# Patient Record
Sex: FEMALE | Race: WHITE | NOT HISPANIC OR LATINO | Employment: UNEMPLOYED | URBAN - NONMETROPOLITAN AREA
[De-identification: names, ages, dates, MRNs, and addresses within clinical notes are randomized per-mention and may not be internally consistent; named-entity substitution may affect disease eponyms.]

---

## 2017-11-04 ENCOUNTER — OFFICE VISIT (OUTPATIENT)
Dept: URGENT CARE | Facility: PHYSICIAN GROUP | Age: 62
End: 2017-11-04
Payer: MEDICAID

## 2017-11-04 VITALS
OXYGEN SATURATION: 95 % | RESPIRATION RATE: 18 BRPM | DIASTOLIC BLOOD PRESSURE: 70 MMHG | HEIGHT: 64 IN | SYSTOLIC BLOOD PRESSURE: 122 MMHG | WEIGHT: 138 LBS | BODY MASS INDEX: 23.56 KG/M2 | HEART RATE: 60 BPM | TEMPERATURE: 97.8 F

## 2017-11-04 DIAGNOSIS — Z48.02 ENCOUNTER FOR STAPLE REMOVAL: ICD-10-CM

## 2017-11-04 PROCEDURE — 99202 OFFICE O/P NEW SF 15 MIN: CPT | Performed by: PHYSICIAN ASSISTANT

## 2017-11-04 NOTE — PROGRESS NOTES
"Chief Complaint   Patient presents with   • Suture / Staple Removal     x2wks R hip, placed by [facility]       HISTORY OF PRESENT ILLNESS: Patient is a 62 y.o. female who presents today because she is requesting to have the staples removed from her right hip incision. She had a surgery 2 weeks ago with an orthopedic doctor and vagus and she is unable to get back down. The area seems to be healing well without infection    There are no active problems to display for this patient.      Allergies:Morphine    Current Outpatient Prescriptions Or[facility] in [facility]   Medication Sig Dispense Refill   • HYDROCODONE-ACETAMINOPHEN PO Take  by mouth.     • TRAZODONE HCL PO Take  by mouth.     • DiphenhydrAMINE HCl (BENADRYL ALLERGY PO) Take  by mouth.     • MELOXICAM PO Take  by mouth.     • LORazepam (ATIVAN PO) Take  by mouth.     • gabapentin (NEURONTIN) 300 MG Cap Take 1 Cap by mouth 2 Times a Day. 90 Cap 0     No current Epic-ordered facility-administered medications on file.        History reviewed. No pertinent past medical history.    Social History   Substance Use Topics   • Smoking status: Never Smoker   • Smokeless tobacco: Never Used   • Alcohol use Yes      Comment: occ       No family status information on file.   History reviewed. No pertinent family history.    ROS:  Review of Systems   Constitutional: Negative for fever, chills, weight loss and malaise/fatigue.   HENT: Negative for ear pain, nosebleeds, congestion, sore throat and neck pain.    Eyes: Negative for blurred vision.   Respiratory: Negative for cough, sputum production, shortness of breath and wheezing.    Cardiovascular: Negative for chest pain, palpitations, orthopnea and leg swelling.   Gastrointestinal: Negative for heartburn, nausea, vomiting and abdominal pain.   Genitourinary: Negative for dysuria, urgency and frequency.     Exam:  Blood pressure 122/70, pulse 60, temperature 36.6 °C (97.8 °F), resp. rate 18, height 1.626 m (5' 4\"), weight 62.6 " kg (138 lb), SpO2 95 %, not currently breastfeeding.  General:  Well nourished, well developed female in NAD  Head:Normocephalic, atraumatic  Eyes: PERRLA, EOM within normal limits, no conjunctival injection, no scleral icterus, visual fields and acuity grossly intact.  Extremities: no clubbing, cyanosis, or edema.  Skin: Right hip has healing incision from surgery with 16 staples. No signs of infection.    Please note that this dictation was created using voice recognition software. I have made every reasonable attempt to correct obvious errors, but I expect that there are errors of grammar and possibly content that I did not discover before finalizing the note.    Assessment/Plan:  1. Encounter for staple removal     Staples removed without complication, Steri-Strips placed.    Followup with primary care in the next 7-10 days if not significantly improving, return to the urgent care or go to the emergency room sooner for any worsening of symptoms.

## 2018-12-29 ENCOUNTER — OFFICE VISIT (OUTPATIENT)
Dept: URGENT CARE | Facility: PHYSICIAN GROUP | Age: 63
End: 2018-12-29

## 2018-12-29 VITALS
TEMPERATURE: 98.2 F | RESPIRATION RATE: 14 BRPM | DIASTOLIC BLOOD PRESSURE: 84 MMHG | SYSTOLIC BLOOD PRESSURE: 130 MMHG | WEIGHT: 139 LBS | HEART RATE: 82 BPM | BODY MASS INDEX: 23.86 KG/M2 | OXYGEN SATURATION: 98 %

## 2018-12-29 DIAGNOSIS — J06.9 VIRAL URI WITH COUGH: ICD-10-CM

## 2018-12-29 LAB
FLUAV+FLUBV AG SPEC QL IA: POSITIVE
INT CON NEG: NORMAL
INT CON POS: NORMAL

## 2018-12-29 PROCEDURE — 99213 OFFICE O/P EST LOW 20 MIN: CPT | Performed by: PHYSICIAN ASSISTANT

## 2018-12-29 PROCEDURE — 87804 INFLUENZA ASSAY W/OPTIC: CPT | Performed by: PHYSICIAN ASSISTANT

## 2018-12-30 ASSESSMENT — ENCOUNTER SYMPTOMS: COUGH: 1

## 2018-12-31 ASSESSMENT — ENCOUNTER SYMPTOMS
SORE THROAT: 1
BLURRED VISION: 0
CHILLS: 0
SHORTNESS OF BREATH: 0
DIARRHEA: 0
HEADACHES: 1
NAUSEA: 0
MYALGIAS: 0
FEVER: 1
WHEEZING: 0
RHINORRHEA: 1
VOMITING: 0
SPUTUM PRODUCTION: 0
DIZZINESS: 0
EYE PAIN: 0

## 2018-12-31 NOTE — PROGRESS NOTES
Subjective:      Nan Barry is a 63 y.o. female who presents with Cough (started yesterday)      URI    This is a new problem. The current episode started yesterday. The problem has been unchanged. Maximum temperature: Subjective fever last night. Associated symptoms include congestion, coughing, headaches, a plugged ear sensation, rhinorrhea and a sore throat. Pertinent negatives include no chest pain, diarrhea, ear pain, nausea, rash, vomiting or wheezing. She has tried acetaminophen for the symptoms. The treatment provided mild relief.       Review of Systems   Constitutional: Positive for fever (Subjective fever last night) and malaise/fatigue. Negative for chills.   HENT: Positive for congestion, rhinorrhea and sore throat. Negative for ear pain.    Eyes: Negative for blurred vision and pain.   Respiratory: Positive for cough. Negative for sputum production, shortness of breath and wheezing.    Cardiovascular: Negative for chest pain.   Gastrointestinal: Negative for diarrhea, nausea and vomiting.   Musculoskeletal: Negative for myalgias.   Skin: Negative for rash.   Neurological: Positive for headaches. Negative for dizziness.       PMH:  has no past medical history on file.  MEDS:   Current Outpatient Prescriptions:   •  HYDROCODONE-ACETAMINOPHEN PO, Take  by mouth., Disp: , Rfl:   •  TRAZODONE HCL PO, Take  by mouth., Disp: , Rfl:   •  DiphenhydrAMINE HCl (BENADRYL ALLERGY PO), Take  by mouth., Disp: , Rfl:   •  MELOXICAM PO, Take  by mouth., Disp: , Rfl:   •  LORazepam (ATIVAN PO), Take  by mouth., Disp: , Rfl:   •  gabapentin (NEURONTIN) 300 MG Cap, Take 1 Cap by mouth 2 Times a Day., Disp: 90 Cap, Rfl: 0  ALLERGIES:   Allergies   Allergen Reactions   • Flagyl [Kdc:Metronidazole+Tartrazine] Unspecified     Insomnia per pt   • Morphine      SURGHX: No past surgical history on file.  SOCHX:  reports that she has never smoked. She has never used smokeless tobacco. She reports that she drinks alcohol.  She reports that she does not use drugs.  FH: Family history was reviewed, no pertinent findings to report     Objective:     /84   Pulse 82   Temp 36.8 °C (98.2 °F) (Temporal)   Resp 14   Wt 63 kg (139 lb)   SpO2 98%   BMI 23.86 kg/m²        Physical Exam   Constitutional: She is oriented to person, place, and time. Vital signs are normal. She appears well-developed and well-nourished.   HENT:   Head: Normocephalic and atraumatic.   Right Ear: Tympanic membrane, external ear and ear canal normal.   Left Ear: Tympanic membrane, external ear and ear canal normal.   Nose: Mucosal edema and rhinorrhea present.   Mouth/Throat: Uvula is midline, oropharynx is clear and moist and mucous membranes are normal.   Eyes: Pupils are equal, round, and reactive to light. Conjunctivae and EOM are normal.   Neck: Normal range of motion.   Cardiovascular: Normal rate, regular rhythm and normal heart sounds.    No murmur heard.  Pulmonary/Chest: Effort normal and breath sounds normal. She has no decreased breath sounds. She has no wheezes. She has no rhonchi. She has no rales.   Lymphadenopathy:     She has no cervical adenopathy.   Neurological: She is alert and oriented to person, place, and time.   Skin: Skin is warm and dry. Capillary refill takes less than 2 seconds.   Psychiatric: She has a normal mood and affect. Her behavior is normal. Judgment normal.   Vitals reviewed.      POCT Influenza A/B - Negative     Assessment/Plan:     1. Viral URI with cough  - POCT Influenza A/B  - OTC cold/flu medications  - PO fluids  - Rest  - Tylenol or ibuprofen as needed for fever > 100.4 F      Differential Diagnosis, natural history, and supportive care discussed. Return to the Urgent Care or follow up with your PCP if symptoms fail to resolve, or for any new or worsening symptoms. Emergency room precautions discussed. Patient and/or family appears understanding of information.

## 2024-11-26 ENCOUNTER — HOSPITAL ENCOUNTER (EMERGENCY)
Facility: MEDICAL CENTER | Age: 69
End: 2024-11-26
Attending: EMERGENCY MEDICINE
Payer: MEDICARE

## 2024-11-26 VITALS
TEMPERATURE: 97.3 F | SYSTOLIC BLOOD PRESSURE: 142 MMHG | HEIGHT: 65 IN | HEART RATE: 74 BPM | DIASTOLIC BLOOD PRESSURE: 81 MMHG | RESPIRATION RATE: 16 BRPM | OXYGEN SATURATION: 94 % | BODY MASS INDEX: 29.79 KG/M2 | WEIGHT: 178.79 LBS

## 2024-11-26 DIAGNOSIS — M79.604 RIGHT LEG PAIN: ICD-10-CM

## 2024-11-26 DIAGNOSIS — M54.16 LUMBAR RADICULOPATHY: ICD-10-CM

## 2024-11-26 PROCEDURE — 99283 EMERGENCY DEPT VISIT LOW MDM: CPT

## 2024-11-26 PROCEDURE — 700102 HCHG RX REV CODE 250 W/ 637 OVERRIDE(OP): Performed by: EMERGENCY MEDICINE

## 2024-11-26 PROCEDURE — A9270 NON-COVERED ITEM OR SERVICE: HCPCS | Performed by: EMERGENCY MEDICINE

## 2024-11-26 RX ORDER — METHYLPREDNISOLONE 4 MG/1
TABLET ORAL
Qty: 21 EACH | Refills: 0 | Status: SHIPPED | OUTPATIENT
Start: 2024-11-26

## 2024-11-26 RX ORDER — ACETAMINOPHEN 500 MG
1000 TABLET ORAL ONCE
Status: COMPLETED | OUTPATIENT
Start: 2024-11-26 | End: 2024-11-26

## 2024-11-26 RX ORDER — IBUPROFEN 600 MG/1
600 TABLET, FILM COATED ORAL ONCE
Status: COMPLETED | OUTPATIENT
Start: 2024-11-26 | End: 2024-11-26

## 2024-11-26 RX ORDER — CYCLOBENZAPRINE HCL 5 MG
5-10 TABLET ORAL 3 TIMES DAILY PRN
Qty: 30 TABLET | Refills: 0 | Status: SHIPPED | OUTPATIENT
Start: 2024-11-26

## 2024-11-26 RX ADMIN — IBUPROFEN 600 MG: 600 TABLET, FILM COATED ORAL at 14:03

## 2024-11-26 RX ADMIN — ACETAMINOPHEN 1000 MG: 500 TABLET ORAL at 14:02

## 2024-11-26 NOTE — ED NOTES
Pt given d/c instructions, f/u info and aware of RX x 1 for  with verbal understanding.  VSS at discharge.  Pt ambulatory fraom the ED w/ steady gait.  All belongings in possession on discharge.  Pt and family member escorted to the lobby by RN.

## 2024-11-26 NOTE — ED TRIAGE NOTES
"Chief Complaint   Patient presents with    Pain     Right leg pain \"for a long time.\" Pain is more intense and radiates now up to groin and knee. CMS intact.   Hx of femur dysplasia and hip replacements.        Pt ambulatory to triage. Pt A&Ox4, room air.     Pt to lobby . Pt educated on alerting staff in changes to condition. Pt verbalized understanding.     BP (!) 145/88   Pulse 81   Temp 36.3 °C (97.3 °F) (Temporal)   Resp 20   Ht 1.651 m (5' 5\")   Wt 81.1 kg (178 lb 12.7 oz)   SpO2 97%   BMI 29.75 kg/m²     "

## 2024-11-26 NOTE — ED PROVIDER NOTES
"ER Provider Note    Scribed for Dave Martinez M.D. by Ceferino Moyer. 11/26/2024   12:47 PM    Primary Care Provider: MARIO Bunch    CHIEF COMPLAINT  Chief Complaint   Patient presents with    Pain     Right leg pain \"for a long time.\" Pain is more intense and radiates now up to groin and knee. CMS intact.   Hx of femur dysplasia and hip replacements.      EXTERNAL RECORDS REVIEWED  Outpatient Notes Phone call to [facility] eight days ago, MRI ordered. Seen in October for knee pain, given Mobic and referred to PT    HPI/ROS    LIMITATION TO HISTORY   Select: : None    Nan Barry is a 69 y.o. female who presents to the ED for evaluation of right leg pain onset prior to arrival. She states that she has been having right leg pain for a couple months now, she has a history of femur dysplasia and hip replacements. This pain started in her right knee and will now radiate upward and into her groin, whereas it used to stay localized to the knee. This pain is worsened when she sits down. She has been seen at [facility] for this, plain films were obtained and they planned for an MRI of her hip. She denies any left leg pain. Mobic was given for her pain from [facility] but does not help her pain, and she shortly discontinued taking it after prescription. She has been taking Tylenol mostly.     PAST MEDICAL HISTORY  History reviewed. No pertinent past medical history.    SURGICAL HISTORY  History reviewed. No pertinent surgical history.    FAMILY HISTORY  History reviewed. No pertinent family history.    SOCIAL HISTORY   reports that she has never smoked. She has never used smokeless tobacco. She reports current alcohol use. She reports that she does not use drugs.    CURRENT MEDICATIONS  Discharge Medication List as of 11/26/2024  2:16 PM        CONTINUE these medications which have NOT CHANGED    Details   !! meloxicam (MOBIC) 15 MG tablet Take 1 Tablet by mouth every day., Disp-30 Tablet, R-0, Normal    " "  HYDROCODONE-ACETAMINOPHEN PO Take  by mouth., Historical Med      TRAZODONE HCL PO Take  by mouth., Historical Med      DiphenhydrAMINE HCl (BENADRYL ALLERGY PO) Take  by mouth., Historical Med      !! MELOXICAM PO Take  by mouth., Historical Med      LORazepam (ATIVAN PO) Take  by mouth., Historical Med      gabapentin (NEURONTIN) 300 MG Cap Take 1 Cap by mouth 2 Times a Day., Disp-90 Cap, R-0, Print       !! - Potential duplicate medications found. Please discuss with provider.          ALLERGIES  Allergies   Allergen Reactions    Flagyl [Kdc:Metronidazole+Tartrazine] Unspecified     Insomnia per pt    Morphine         PHYSICAL EXAM  BP (!) 145/88   Pulse 81   Temp 36.3 °C (97.3 °F) (Temporal)   Resp 20   Ht 1.651 m (5' 5\")   Wt 81.1 kg (178 lb 12.7 oz)   SpO2 97%   BMI 29.75 kg/m²    Constitutional: Mild distress    HENT: Normocephalic, Atraumatic.  Oropharynx moist.   Eyes: EOMI, Conjunctiva normal, No discharge.   CV: Good pulses  Thorax & Lungs: No respiratory distress.   Skin: Warm, Dry, No rash noted    Musculoskeletal: No major deformities noted. Slight tenderness to palpation of the right suprapubic patellar area, tenderness in right gluteal area. Full passive and active ROM of hip and knee. 2+ pulses distally.  Neurologic: Awake, alert. Moves all extremities spontaneously.  Psychiatric: Affect normal, Mood normal.      COURSE & MEDICAL DECISION MAKING     ED OBS: No; Patient does not meet criteria for ED Observation.     INITIAL ASSESSMENT, COURSE AND PLAN    Care Narrative: Patient with right leg pain.  On examination I do not believe it is representative of the knee or the hip joint.  Seems just superior to the knee.  I question was there this is a radicular pain from the lumbar area.  Have recommended Medrol Dosepak, muscle relaxers, Tylenol, ibuprofen, following up with .  Possible MRI of the lumbar spine.  Discussed with her no indication for an acute MRI at this time.  Will discharge " patient home.    12:47 PM - Patient was evaluated at bedside. She presents for right knee pain, symptoms consistent with lumbar radiculopathy. She is requesting MRI imaging of her knee but after discussion we agreed that this would likely not solve her problems. I recommend high dose Tylenol and Motrin, as well as beginning a steroid dose pack. Patient will now be discharged at this time. Discussed return precautions and plan for at home care. Flexeril and Medrol dosepak sent to pharmacy. Patient verbalizes understanding and agreement to this plan of care.       DISPOSITION AND DISCUSSIONS    I have discussed management of the patient with the following physicians and ELINOR's:  None    Discussion of management with other [facility] or appropriate source(s): None     Escalation of care considered, and ultimately not performed: diagnostic imaging.    Barriers to care at this time, including but not limited to: None    Decision tools and prescription drugs considered including, but not limited to: Pain Medications given here, sent to pharmacy .    DISPOSITION:  Patient will be discharged home in stable condition.    FOLLOW UP:  [facility], Emergency Dept  [address] [facility] [id number] [phone]    If symptoms worsen    Main Bui M.D.  [address] [id number]  [phone]          OUTPATIENT MEDICATIONS:  Discharge Medication List as of 11/26/2024  2:16 PM        START taking these medications    Details   methylPREDNISolone (MEDROL DOSEPAK) 4 MG Tablet Therapy Pack Use as directed, Disp-21 Each, R-0, Normal      cyclobenzaprine (FLEXERIL) 5 mg tablet Take 1-2 Tablets by mouth 3 times a day as needed for Moderate Pain or Mild Pain., Disp-30 Tablet, R-0, Normal           FINAL DIAGNOSIS  1. Right leg pain    2. Lumbar radiculopathy         I, Ceferino Moyer (Scribe), am scribing for, and in the presence of, Dave Martinez M.D..    Electronically signed by: Ceferino Moyer  (Scribe), 11/26/2024    IDave M.D. personally performed the services described in this documentation, as scribed by Ceferino Moyer in my presence, and it is both accurate and complete.      The note accurately reflects work and decisions made by me.  Dave Martinez M.D.  11/26/2024  4:18 PM

## 2024-12-23 ENCOUNTER — APPOINTMENT (OUTPATIENT)
Dept: URGENT CARE | Facility: PHYSICIAN GROUP | Age: 69
End: 2024-12-23
Payer: MEDICARE